# Patient Record
Sex: MALE | Race: OTHER | Employment: FULL TIME | URBAN - METROPOLITAN AREA
[De-identification: names, ages, dates, MRNs, and addresses within clinical notes are randomized per-mention and may not be internally consistent; named-entity substitution may affect disease eponyms.]

---

## 2019-09-03 ENCOUNTER — HOSPITAL ENCOUNTER (EMERGENCY)
Age: 39
Discharge: HOME | End: 2019-09-03
Attending: EMERGENCY MEDICINE
Payer: OTHER MISCELLANEOUS

## 2019-09-03 ENCOUNTER — APPOINTMENT (OUTPATIENT)
Dept: CT IMAGING | Age: 39
End: 2019-09-03
Attending: EMERGENCY MEDICINE
Payer: OTHER MISCELLANEOUS

## 2019-09-03 ENCOUNTER — APPOINTMENT (OUTPATIENT)
Dept: GENERAL RADIOLOGY | Age: 39
End: 2019-09-03
Attending: EMERGENCY MEDICINE
Payer: OTHER MISCELLANEOUS

## 2019-09-03 VITALS
SYSTOLIC BLOOD PRESSURE: 130 MMHG | TEMPERATURE: 98.5 F | DIASTOLIC BLOOD PRESSURE: 88 MMHG | RESPIRATION RATE: 18 BRPM | OXYGEN SATURATION: 99 % | HEART RATE: 61 BPM

## 2019-09-03 DIAGNOSIS — S01.01XA LACERATION OF SCALP, INITIAL ENCOUNTER: Primary | ICD-10-CM

## 2019-09-03 DIAGNOSIS — S01.21XA LACERATION OF NOSE, INITIAL ENCOUNTER: ICD-10-CM

## 2019-09-03 PROCEDURE — 74011000250 HC RX REV CODE- 250: Performed by: EMERGENCY MEDICINE

## 2019-09-03 PROCEDURE — 72070 X-RAY EXAM THORAC SPINE 2VWS: CPT

## 2019-09-03 PROCEDURE — 90471 IMMUNIZATION ADMIN: CPT | Performed by: EMERGENCY MEDICINE

## 2019-09-03 PROCEDURE — 72125 CT NECK SPINE W/O DYE: CPT

## 2019-09-03 PROCEDURE — 90715 TDAP VACCINE 7 YRS/> IM: CPT | Performed by: EMERGENCY MEDICINE

## 2019-09-03 PROCEDURE — 72100 X-RAY EXAM L-S SPINE 2/3 VWS: CPT

## 2019-09-03 PROCEDURE — 74011250636 HC RX REV CODE- 250/636: Performed by: EMERGENCY MEDICINE

## 2019-09-03 PROCEDURE — 70450 CT HEAD/BRAIN W/O DYE: CPT

## 2019-09-03 PROCEDURE — 99283 EMERGENCY DEPT VISIT LOW MDM: CPT | Performed by: EMERGENCY MEDICINE

## 2019-09-03 RX ORDER — LIDOCAINE HYDROCHLORIDE AND EPINEPHRINE 10; 10 MG/ML; UG/ML
10 INJECTION, SOLUTION INFILTRATION; PERINEURAL
Status: COMPLETED | OUTPATIENT
Start: 2019-09-03 | End: 2019-09-03

## 2019-09-03 RX ADMIN — TETANUS TOXOID, REDUCED DIPHTHERIA TOXOID AND ACELLULAR PERTUSSIS VACCINE, ADSORBED 0.5 ML: 5; 2.5; 8; 8; 2.5 SUSPENSION INTRAMUSCULAR at 19:35

## 2019-09-03 RX ADMIN — LIDOCAINE HYDROCHLORIDE,EPINEPHRINE BITARTRATE 100 MG: 10; .01 INJECTION, SOLUTION INFILTRATION; PERINEURAL at 19:00

## 2019-09-03 NOTE — ED NOTES
I have reviewed discharge instructions with the patient. The patient verbalized understanding. Patient left ED via Discharge Method: ambulatory to Home with friends. Opportunity for questions and clarification provided. Patient given 0 scripts. To continue your aftercare when you leave the hospital, you may receive an automated call from our care team to check in on how you are doing. This is a free service and part of our promise to provide the best care and service to meet your aftercare needs.  If you have questions, or wish to unsubscribe from this service please call [phone]. Thank you for Choosing our [address] [place] [address] Emergency Department.

## 2019-09-03 NOTE — LETTER
[address] [facility] 
[facility] EMERGENCY DEPT 
[address] [facility] 
[phone] Work/School Note Date: 9/3/2019 To Whom It May concern: 
 
Meghanat Blood was seen and treated today in the emergency room by the following provider(s): 
Attending Provider: Eleazar Kumar DO. Everrett Blood may return to work on 9/4/2019 with dressing/padding on scalp to protect staples from hard hat. Sincerely, Kwasi Bhatt DO

## 2019-09-03 NOTE — DISCHARGE INSTRUCTIONS
Patient Education        Feliz cerrados con puntos de sutura: Instrucciones de cuidado - [ Cuts Closed With Stitches: Care Instructions ]  Instrucciones de cuidado  Un sacha puede ocurrir en cualquier parte del cuerpo. El médico utilizó puntos de sutura para cerrar el sacha. El [place] de puntos de sutura también ayuda a que el sacha sane y reduce la formación de cicatrices. A veces, se colocan cintas adhesivas conocidas ian [employer]. Si el sacha es profundo y [employer], es posible que el médico haya colocado dos capas de puntos. La capa más profunda junta la parte profunda del sacha. Estos puntos se disuelven y no es necesario extraerlos. Los puntos en la capa superior son los que usted puede stacy sobre el sacha. Es probable que tenga jazlyn venda que cubre los puntos. Será necesario que [place] puntos, por lo general, al cabo de 7 a 14 juan. El médico lo lou examinado minuciosamente, markos pueden presentarse problemas más tarde. Si nota algún problema o nuevos síntomas, busque tratamiento médico de inmediato. La atención de seguimiento es jazlyn parte clave de hernandez tratamiento y seguridad. Asegúrese de hacer y acudir a todas las citas, y llame a hernandez médico si está teniendo problemas. También es jazlyn buena idea saber los resultados de radhika exámenes y mantener jazlyn lista de los medicamentos que adam. ¿Cómo puede cuidarse en el hogar? · Mantenga el sacha seco sue las primeras 24 a 48 horas. Después de [address] [employer], puede ducharse si el médico lo [place]. Seque el sacha con toques suaves de toalla. · No remoje el sacha, ian en jazlyn jamshid. Hernandez médico le indicará cuándo es seguro mojar el sacha. · Si hernandez médico le dijo cómo cuidarse el sacha, siga las instrucciones de hernandez médico. Si no le parvin instrucciones, siga estos consejos generales:  ? Después de las primeras 24 a 48 horas, lávese la reji alrededor del sacha con agua limpia 2 veces al día.  No use peróxido de hidrógeno (agua [place]) ni alcohol, los cuales pueden retrasar la sanación. ? Puede cubrirse el sacha con jazlyn capa delgada de vaselina y [place] venda no adherente. ? Aplíquese más vaselina y [place] la venda según sea necesario. · Mantenga elevada la reji afectada sobre jazlyn almohada en cualquier momento que esté sentado o acostado sue los 3 días siguientes. Trate de mantenerla por encima del nivel del corazón. [place] ayudará a reducir la hinchazón. · Evite toda actividad que pudiera hacer que el sacha se gloria de nuevo. · No se quite los puntos de sutura usted mismo. Hernandez médico le dirá cuándo regresar para [employer] puntos de [place]. · Déjese las Steri[employer]Strips [employer] se caigan. · Sea matt con los medicamentos. Kourtney y siga todas las indicaciones de la [place]. ? Si el médico le recetó un analgésico (medicamento para el dolor), tómelo según las indicaciones. ? Si no está tomando un analgésico recetado, pregúntele a hernandez médico si puede ismael isatu de [employer]. ¿Cuándo debe pedir ayuda? Llame a hernandez médico ahora mismo o busque atención médica inmediata si:    · Tiene dolor nuevo, o el dolor empeora.     · La piel cercana al sacha está fría o pálida, o cambia de color.     · Siente hormigueo, debilitamiento o entumecimiento cerca del sacha.     · El sacha comienza a sangrar y la ade empapa la venda. Es normal que salgan pequeñas cantidades de ade.     · Tiene dificultad para  la reji cerca del sacha.     · Tiene síntomas de infección, tales ian:  ? Aumento del dolor, la hinchazón, la temperatura o el enrojecimiento alrededor del sacha. ? Vetas rojizas que salen del sacha. ? Pus que sale del sacha. ? Shai Armendarizan especial atención a los cambios en hernandez soto y asegúrese de comunicarse con hernandez médico si:    · El sacha vuelve a abrirse.     · No mejora ian se esperaba. ¿Dónde puede encontrar más información en inglés? Mariah Erazo a http://rayna-antony.[email or web address]   Escriba R217 en la búsqueda para aprender más acerca de \"Feliz cerrados con puntos de sutura: Instrucciones de cuidado - [ Cuts Closed With Stitches: Care Instructions ]. \"  Revisado: 23 septiembre, 2018  Versión del contenido: 12.1  © 9709-6536 Healthwise, Incorporated. Las instrucciones de cuidado fueron adaptadas bajo licencia por [employer] (which disclaims liability or warranty for this information). Si usted tiene [place] afección médica o sobre estas instrucciones, siempre pregunte a garay profesional de soto. Healthwise, Incorporated niega toda garantía o responsabilidad por garay uso de esta información. Patient Education        Illona Bloodgood con grapas: Instrucciones de cuidado - [ Cuts Closed With Staples: Care Instructions ]  Instrucciones de cuidado  Un sacha puede ocurrir en cualquier parte del cuerpo. El médico utilizó grapas para cerrar el sacha. Las grapas sirven para cerrar el sacha fácil y rápidamente, lo que ayuda a sanar el sacha. A veces, un sacha puede lesionar tendones, vasos sanguíneos o nervios. Si el sacha fue profundo y a través de la piel, el médico puede idris hecho jazlyn capa de puntos por debajo de las grapas. En la capa más profunda de puntos, se juntan las partes profundas del sacha. Esos puntos se disolverán, y no es necesario quitarlos. Las grapas de la capa superior son lo que ve en el sacha. Es posible que le pongan jazlyn venda. Será necesario que [employer] grapas, por lo general entre 7 y 15 días. El médico lo lou revisado [address] [place] [employer] problemas pueden aparecer más tarde. Si observa algún problema o un síntoma nuevo, obtenga tratamiento médico de inmediato. La atención de seguimiento es jazlyn parte clave de garay tratamiento y seguridad. Asegúrese de hacer y acudir a todas las citas, y llame a garay médico si está teniendo problemas. También es jazlyn buena idea saber los resultados de radhika exámenes y mantener jazlyn lista de los medicamentos que adam.   ¿Cómo puede cuidarse en el hogar?  · Mantenga seco el sacha sue las primeras 24 o 50 horas. Después de esto, puede ducharse si garay médico lo aprueba. Seque el sacha con toques suaves de toalla. · No sumerja el sacha, ian, por ejemplo, en jazlyn bañera. Garay médico le dirá cuándo es seguro mojar el sacha. · Si garay médico le dijo cómo cuidarse el sacha, siga las instrucciones de garay médico. Si no le parvin instrucciones, siga estos consejos generales:  ? Después de las primeras 24 a 48 horas, lave la reji alrededor del sacha con agua limpia 2 veces al día. No use peróxido de hidrógeno (agua [place]) ni alcohol, los cuales pueden retrasar la sanación. ? Puede cubrir el sacha con jazlyn capa delgada de vaselina y [facility] venda no adherente. ? Aplíquese más vaselina y [facility] la venda según sea necesario. · Evite cualquier actividad que podría hacer que el sacha se vuelva a abrir. · No se quite las grapas usted mismo. Garay médico le dirá cuándo regresar para que le quiten las grapas. · [employer] analgésicos (medicamentos para el dolor) exactamente según lo indicado. ? Si el [employer] parvin un analgésico recetado, [employer] se lo recetó.  ? Si usted no está tomando un analgésico recetado, pregúntele a garay médico si puede ismael un medicamento de [address] ¿Cuándo debe pedir ayuda? Llame a garay médico ahora mismo o busque atención médica inmediata si:    · Siente un dolor nuevo o el dolor empeora.     · La piel cercana al sacha está fría o pálida, o cambia de color.     · Siente hormigueo, debilitamiento o entumecimiento cerca del sacha.     · El sacha comienza a sangrar, y la ade empapa la venda. Es normal que salgan pequeñas cantidades de [place].     · Tiene dificultades para  la reji cercana al sacha.     · Tiene síntomas de infección, tales ian:  ? Aumento del dolor, la hinchazón, el enrojecimiento o la temperatura alrededor del sacha. ? Vetas rojizas que salen del sacha. ? Pus que sale del sacha.   ? Marlane Pac especial atención a los cambios en garay soto y asegúrese de comunicarse con garay médico si:    · No mejora ian se esperaba. ¿Dónde puede encontrar más información en inglés? Bee Charles a http://rayna-antony.info/. Barbara Reel X133 en la búsqueda para aprender más acerca de \"Feliz cerrados con grapas: Instrucciones de cuidado - [ Cuts Closed With Staples: Care Instructions ]. \"  Revisado: 23 septiembre, 2018  Versión del contenido: 12.1  © 9298-2811 Healthwise, Incorporated. Las instrucciones de cuidado fueron adaptadas bajo licencia por [employer] (which disclaims liability or warranty for this information). Si usted tiene [facility] afección médica o sobre estas instrucciones, siempre pregunte a garay profesional de soto. [employer] niega toda garantía o responsabilidad por garay uso de esta información.

## 2019-09-03 NOTE — PROGRESS NOTES
present for discharge instructions. Thank you,      Lulu Blum, [address] [facility] /  [email or web address] c: [phone] / [address] / [place], [address] [facility]  [email or web address]

## 2019-09-03 NOTE — ED PROVIDER NOTES
Patient presents with a work-related injury. 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 The patient was wearing a hard hat which was broken in the process. He sustained a laceration to the scalp in the upper forehead as well as to the bridge of his nose. He denies loss of consciousness and his tetanus status is unknown. The history is provided by the patient. The history is limited by a language barrier. A  was used. Head Injury    The incident occurred 1 to 2 hours ago. He came to the ER via walk-in. The injury mechanism was a direct blow. The volume of blood lost was moderate. The quality of the pain is described as sharp. The pain is at a severity of 6/10. The pain is moderate. The pain has been constant since the injury. Pertinent negatives include no numbness, no blurred vision, no vomiting, no tinnitus, no disorientation, no weakness and no memory loss. He has tried nothing for the symptoms. The treatment provided no relief. There was no loss of consciousness. He has been behaving normally. It is unknown when the patient last had a tetanus shot. No past medical history on file. No past surgical history on file. No family history on file.     Social History     Socioeconomic History    Marital status:      Spouse name: Not on file    Number of children: Not on file    Years of education: Not on file    Highest education level: Not on file   Occupational History    Not on file   Social Needs    Financial resource strain: Not on file    Food insecurity:     Worry: Not on file     Inability: Not on file    Transportation needs:     Medical: Not on file     Non-medical: Not on file   Tobacco Use    Smoking status: Not on file   Substance and Sexual Activity    Alcohol use: Not on file    Drug use: Not on file    Sexual activity: Not on file   Lifestyle    Physical activity:     Days per week: Not on file     Minutes per session: Not on file    Stress: Not on file   Relationships    Social connections:     Talks on phone: Not on file     Gets together: Not on file     Attends [employer] service: Not on file     Active member of club or organization: Not on file     Attends meetings of clubs or organizations: Not on file     Relationship status: Not on file    Intimate partner violence:     Fear of current or ex partner: Not on file     Emotionally abused: Not on file     Physically abused: Not on file     Forced sexual activity: Not on file   Other Topics Concern    Not on file   Social History Narrative    Not on file         ALLERGIES: Patient has no known allergies. Review of Systems   HENT: Negative for tinnitus. Eyes: Negative for blurred vision. Gastrointestinal: Negative for vomiting. Neurological: Negative for weakness and numbness. Psychiatric/Behavioral: Negative for memory loss. All other systems reviewed and are negative. Vitals:    09/03/19 1752   BP: 134/69   Pulse: 65   Resp: 16   Temp: 98.6 °F (37 °C)   SpO2: 96%            Physical Exam   Constitutional: He is oriented to person, place, and time. He appears well-developed and well-nourished. No distress. HENT:   Head: Normocephalic. Right Ear: External ear normal.   Left Ear: External ear normal.   Mouth/Throat: Oropharynx is clear and moist. No oropharyngeal exudate. There is a 5 cm laceration noted in the hairline of the scalp above the forehead. Oriented transversely approximately 5 cm long. There is a 1 cm laceration noted to the bridge of the nose. There is some evidence of some bleeding in the right nares but no displacement of the nasal septum and no septal hematomas noted. TMs are clear bilaterally. Eyes: Pupils are equal, round, and reactive to light. Conjunctivae and EOM are normal.   Neck: Normal range of motion. Neck supple.    No cervical spine tenderness noted on examination   Cardiovascular: Normal rate, regular rhythm, normal heart sounds and intact distal pulses. Pulmonary/Chest: Effort normal and breath sounds normal.   Musculoskeletal: Normal range of motion. He exhibits no edema, tenderness or deformity. Neurological: He is alert and oriented to person, place, and time. Skin: Skin is warm and dry. Capillary refill takes less than 2 seconds. He is not diaphoretic. Psychiatric: He has a normal mood and affect. His behavior is normal.   Nursing note and vitals reviewed.        MDM  Number of Diagnoses or Management Options  Laceration of nose, initial encounter:   Laceration of scalp, initial encounter:      Amount and/or Complexity of Data Reviewed  Clinical lab tests: ordered and reviewed  Tests in the radiology section of CPT®: ordered and reviewed  Review and summarize past medical records: yes  Independent visualization of images, tracings, or specimens: yes    Risk of Complications, Morbidity, and/or Mortality  Presenting problems: moderate  Diagnostic procedures: moderate  Management options: moderate    Patient Progress  Patient progress: stable         Procedures

## 2019-09-03 NOTE — ED TRIAGE NOTES
Pt ambulatory to triage with lacertaion to the top of his head. Pt was hit in the head with an aluminum beam that fell 3 ft from the ceiling, pt was wearing a fiberglass hard hat which is actually what cut his head. Laceration is 2\", bleeding is controlled. No dizziness, no loc, no vomiting. Pt does have cervical neck pain. Per sight  who works with patient every day, pt is at his baseline alert and oriented x 4. Pt denies taking any prescription medication. Unknown when last tetanus shot was.